# Patient Record
Sex: FEMALE | Race: BLACK OR AFRICAN AMERICAN | NOT HISPANIC OR LATINO | Employment: UNEMPLOYED | URBAN - METROPOLITAN AREA
[De-identification: names, ages, dates, MRNs, and addresses within clinical notes are randomized per-mention and may not be internally consistent; named-entity substitution may affect disease eponyms.]

---

## 2020-02-04 ENCOUNTER — HOSPITAL ENCOUNTER (EMERGENCY)
Facility: HOSPITAL | Age: 35
Discharge: HOME | End: 2020-02-04
Attending: EMERGENCY MEDICINE
Payer: MEDICAID

## 2020-02-04 VITALS
RESPIRATION RATE: 18 BRPM | OXYGEN SATURATION: 96 % | DIASTOLIC BLOOD PRESSURE: 89 MMHG | BODY MASS INDEX: 46.95 KG/M2 | HEIGHT: 64 IN | TEMPERATURE: 98 F | SYSTOLIC BLOOD PRESSURE: 156 MMHG | HEART RATE: 78 BPM | WEIGHT: 275 LBS

## 2020-02-04 DIAGNOSIS — S93.601A SPRAIN OF RIGHT FOOT, INITIAL ENCOUNTER: Primary | ICD-10-CM

## 2020-02-04 DIAGNOSIS — R52 PAIN: ICD-10-CM

## 2020-02-04 LAB
B-HCG UR QL: NEGATIVE
CTP QC/QA: YES

## 2020-02-04 PROCEDURE — 81025 URINE PREGNANCY TEST: CPT | Mod: ER | Performed by: EMERGENCY MEDICINE

## 2020-02-04 PROCEDURE — 25000003 PHARM REV CODE 250: Mod: ER | Performed by: NURSE PRACTITIONER

## 2020-02-04 PROCEDURE — 99284 EMERGENCY DEPT VISIT MOD MDM: CPT | Mod: 25,ER

## 2020-02-04 RX ORDER — IBUPROFEN 800 MG/1
800 TABLET ORAL EVERY 6 HOURS PRN
Qty: 24 TABLET | Refills: 0 | Status: SHIPPED | OUTPATIENT
Start: 2020-02-04 | End: 2020-07-22

## 2020-02-04 RX ORDER — KETOROLAC TROMETHAMINE 10 MG/1
10 TABLET, FILM COATED ORAL
Status: COMPLETED | OUTPATIENT
Start: 2020-02-04 | End: 2020-02-04

## 2020-02-04 RX ADMIN — KETOROLAC TROMETHAMINE 10 MG: 10 TABLET, FILM COATED ORAL at 06:02

## 2020-02-04 NOTE — ED PROVIDER NOTES
"Encounter Date: 2020    SCRIBE #1 NOTE: I, Steven Morin, am scribing for, and in the presence of,  RONNIE Reaves. I have scribed the following portions of the note - Other sections scribed: HPI, ROS, PE.       History     Chief Complaint   Patient presents with    Foot Pain     right foot pain x several months. states twisted it and "it's been bothering me"     This is a nontoxic appearing 34 y.o. female with pain to her right foot onset several months. States she may have possibly twisted her foot again recently and has been feeling increased pain since. Denies any numbness or tingling.    The history is provided by the patient. No  was used.   Foot Pain   This is a recurrent problem. The current episode started more than 1 week ago. The problem occurs constantly. The problem has been gradually worsening. Pertinent negatives include no chest pain and no shortness of breath. The symptoms are aggravated by bending, twisting and walking.     Review of patient's allergies indicates:   Allergen Reactions    Sulfa (sulfonamide antibiotics) Swelling     Past Medical History:   Diagnosis Date    Asthma     last inhaler use at beginning of pregnancy    Gestational diabetes      Past Surgical History:   Procedure Laterality Date     SECTION      CHOLECYSTECTOMY      excision bilateral axillary hidradenitis       Family History   Problem Relation Age of Onset    Hypertension Mother     Hypertension Father     Hypertension Brother     Diabetes Brother     Diabetes Paternal Aunt      Social History     Tobacco Use    Smoking status: Never Smoker   Substance Use Topics    Alcohol use: Yes     Comment: pre pregnancy    Drug use: No     Review of Systems   Constitutional: Negative.    HENT: Negative.    Eyes: Negative.    Respiratory: Negative.  Negative for shortness of breath.    Cardiovascular: Negative.  Negative for chest pain.   Gastrointestinal: Negative.    Endocrine: " Negative.    Genitourinary: Negative.    Musculoskeletal: Positive for arthralgias.        Right foot pain    Skin: Negative.    Allergic/Immunologic: Negative.    Neurological: Negative.  Negative for weakness and numbness.   Hematological: Negative.    Psychiatric/Behavioral: Negative.    All other systems reviewed and are negative.      Physical Exam     Initial Vitals [02/04/20 1651]   BP Pulse Resp Temp SpO2   (!) 161/99 81 17 98.2 °F (36.8 °C) 97 %      MAP       --         Physical Exam    Nursing note and vitals reviewed.  Constitutional: She appears well-developed.   HENT:   Right Ear: External ear normal.   Left Ear: External ear normal.   Nose: Nose normal.   Mouth/Throat: Oropharynx is clear and moist.   Eyes: Conjunctivae are normal.   Neck: Normal range of motion. Neck supple.   Cardiovascular: Normal rate, regular rhythm, S1 normal, S2 normal, normal heart sounds and intact distal pulses.   Pulses:       Dorsalis pedis pulses are 2+ on the right side, and 2+ on the left side.   Pulmonary/Chest: Effort normal and breath sounds normal. She has no rhonchi. She has no rales.   Abdominal: Soft. She exhibits no distension.   Musculoskeletal: Normal range of motion. She exhibits no edema.        Right foot: There is tenderness. There is normal range of motion, no swelling and no deformity.        Feet:    Neurological: She is alert and oriented to person, place, and time.   Skin: Skin is warm and dry. Capillary refill takes less than 2 seconds. No rash noted.   Psychiatric: She has a normal mood and affect. Her behavior is normal.         ED Course   Procedures  Labs Reviewed   POCT URINE PREGNANCY          Imaging Results    None       Imaging Results          X-Ray Foot Complete Right (Final result)  Result time 02/04/20 17:34:28    Final result by Rowdy Camacho MD (02/04/20 17:34:28)                 Impression:      No evidence for acute fracture or dislocation.  Generalized soft tissue  swelling.      Electronically signed by: Rowdy Camacho MD  Date:    02/04/2020  Time:    17:34             Narrative:    EXAMINATION:  XR FOOT COMPLETE 3 VIEW RIGHT    CLINICAL HISTORY:  . Pain, unspecified    TECHNIQUE:  AP, lateral, and oblique views of the right foot were performed.    COMPARISON:  None    FINDINGS:  No evidence for acute fracture, dislocation or destructive process.  Lisfranc joint is congruent.  Joint spaces are maintained.  There is marked soft tissue swelling throughout the entire foot.                                  Medical Decision Making:   History:   Old Medical Records: I decided to obtain old medical records.  Initial Assessment:   This is a nontoxic appearing 34 y.o. female with pain to her right foot onset several months. Denies any numbness or tingling.   Differential Diagnosis:   Sprain vs. Fracture.  Independently Interpreted Test(s):   I have ordered and independently interpreted X-rays - see prior notes.  Clinical Tests:   Lab Tests: Ordered and Reviewed  The following lab test(s) were unremarkable: UPT  Radiological Study: Ordered and Reviewed  ED Management:  Medicated with Toradol 10 mg orally.  Post-op shoe applied to right foot.  Discharged with Motrin 800 mg as needed.  Follow-up with PCP in 2 days.             Scribe Attestation:   Scribe #1: I performed the above scribed service and the documentation accurately describes the services I performed. I attest to the accuracy of the note.    This document was produced by a scribe under my direction and in my presence. I agree with the content of the note and have made any necessary edits.     RONNIE Reaves    02/05/2020 7:45 AM                      Clinical Impression:     1. Sprain of right foot, initial encounter    2. Pain                                RONNIE Jeronimo  02/05/20 [id number]

## 2021-12-22 ENCOUNTER — HOSPITAL ENCOUNTER (EMERGENCY)
Facility: HOSPITAL | Age: 36
Discharge: HOME | End: 2021-12-22
Attending: EMERGENCY MEDICINE
Payer: MEDICAID

## 2021-12-22 VITALS
TEMPERATURE: 99 F | SYSTOLIC BLOOD PRESSURE: 161 MMHG | WEIGHT: 200 LBS | BODY MASS INDEX: 34.15 KG/M2 | RESPIRATION RATE: 18 BRPM | OXYGEN SATURATION: 99 % | DIASTOLIC BLOOD PRESSURE: 89 MMHG | HEART RATE: 78 BPM | HEIGHT: 64 IN

## 2021-12-22 DIAGNOSIS — I10 HYPERTENSION, UNSPECIFIED TYPE: ICD-10-CM

## 2021-12-22 DIAGNOSIS — U07.1 COVID-19: Primary | ICD-10-CM

## 2021-12-22 LAB
B-HCG UR QL: NEGATIVE
CTP QC/QA: YES
CTP QC/QA: YES
INFLUENZA A ANTIGEN, POC: NEGATIVE
INFLUENZA B ANTIGEN, POC: NEGATIVE
SARS-COV-2 RDRP RESP QL NAA+PROBE: POSITIVE

## 2021-12-22 PROCEDURE — 87804 INFLUENZA ASSAY W/OPTIC: CPT | Mod: ER

## 2021-12-22 PROCEDURE — 81025 URINE PREGNANCY TEST: CPT | Mod: ER | Performed by: EMERGENCY MEDICINE

## 2021-12-22 PROCEDURE — 99284 EMERGENCY DEPT VISIT MOD MDM: CPT | Mod: 25,ER

## 2021-12-22 PROCEDURE — U0002 COVID-19 LAB TEST NON-CDC: HCPCS | Mod: ER | Performed by: EMERGENCY MEDICINE

## 2021-12-22 RX ORDER — ACETAMINOPHEN 500 MG
500 TABLET ORAL EVERY 6 HOURS PRN
Qty: 20 TABLET | Refills: 0 | Status: SHIPPED | OUTPATIENT
Start: 2021-12-22

## 2021-12-22 RX ORDER — IBUPROFEN 600 MG/1
600 TABLET ORAL EVERY 6 HOURS PRN
Qty: 20 TABLET | Refills: 0 | Status: SHIPPED | OUTPATIENT
Start: 2021-12-22

## 2021-12-22 RX ORDER — BENZONATATE 100 MG/1
100 CAPSULE ORAL 3 TIMES DAILY PRN
Qty: 20 CAPSULE | Refills: 0 | Status: SHIPPED | OUTPATIENT
Start: 2021-12-22 | End: 2022-01-01

## 2021-12-22 RX ORDER — ALBUTEROL SULFATE 90 UG/1
1-2 AEROSOL, METERED RESPIRATORY (INHALATION) EVERY 6 HOURS PRN
Qty: 8 G | Refills: 0 | Status: SHIPPED | OUTPATIENT
Start: 2021-12-22 | End: 2022-12-22

## 2021-12-22 NOTE — DISCHARGE INSTRUCTIONS
You have COVID-19.  Alternate ibuprofen and Tylenol for fever and pain.    Please return to the Emergency Department for any new or worsening symptoms including: chest pain, shortness of breath, loss of consciousness, dizziness, weakness, or any other concerns.     Please follow up with your Primary Care Provider within the week. If you do not have one, you may contact the one listed on your discharge paperwork or you may also call the [facility] Appointment Desk at [phone] to schedule an appointment with one.     Please take all medication as prescribed.

## 2021-12-22 NOTE — ED PROVIDER NOTES
Encounter Date: 2021    SCRIBE #1 NOTE: I, Timothy aMuro, am scribing for, and in the presence of, Keyana Cooper NP.       History     Chief Complaint   Patient presents with    COVID-19 Concerns    Sore Throat     Painful cough x 2 days, chills, scratchy throat, body aches, in contact with covid + pt last week     36 year old female with Asthma and HTN who presents to the ED with complaints of sore throat, cough, and chills since yesterday. Patient endorses a known COVID-19 contact last week. She is not vaccinated against COVID-19. No other complaints at this time.      The history is provided by the patient. No  was used.     Review of patient's allergies indicates:   Allergen Reactions    Sulfa (sulfonamide antibiotics) Swelling     Past Medical History:   Diagnosis Date    Asthma     last inhaler use at beginning of pregnancy    Gestational diabetes      Past Surgical History:   Procedure Laterality Date     SECTION      CHOLECYSTECTOMY      excision bilateral axillary hidradenitis       Family History   Problem Relation Age of Onset    Hypertension Mother     Hypertension Father     Hypertension Brother     Diabetes Brother     Diabetes Paternal Aunt      Social History     Tobacco Use    Smoking status: Never Smoker    Smokeless tobacco: Never Used   Substance Use Topics    Alcohol use: Yes     Comment: ocasionally    Drug use: No     Review of Systems   Constitutional: Positive for chills.   HENT: Positive for sore throat.    Respiratory: Positive for cough.    All other systems reviewed and are negative.      Physical Exam     Initial Vitals [21 1013]   BP Pulse Resp Temp SpO2   (!) 161/89 78 18 98.5 °F (36.9 °C) 99 %      MAP       --         Physical Exam    Constitutional: She appears well-developed and well-nourished. She is not diaphoretic. No distress.   HENT:   Head: Normocephalic and atraumatic.   Neck:   Normal range of  motion.  Pulmonary/Chest: No respiratory distress.   Speaking in full and clear sentences without dyspnea or pause.  No tachypnea.   Musculoskeletal:         General: Normal range of motion.      Cervical back: Normal range of motion.     Neurological: She is alert and oriented to person, place, and time.   Skin: Skin is warm and dry.   Psychiatric: She has a normal mood and affect. Her behavior is normal.         ED Course   Procedures  Labs Reviewed   SARS-COV-2 RDRP GENE - Abnormal; Notable for the following components:       Result Value    POC Rapid COVID Positive (*)     All other components within normal limits    Narrative:     This test utilizes isothermal nucleic acid amplification   technology to detect the SARS-CoV-2 RdRp nucleic acid segment.   The analytical sensitivity (limit of detection) is 125 genome   equivalents/mL.   A POSITIVE result implies infection with the SARS-CoV-2 virus;   the patient is presumed to be contagious.     A NEGATIVE result means that SARS-CoV-2 nucleic acids are not   present above the limit of detection. A NEGATIVE result should be   treated as presumptive. It does not rule out the possibility of   COVID-19 and should not be the sole basis for treatment decisions.   If COVID-19 is strongly suspected based on clinical and exposure   history, re-testing using an alternate molecular assay should be   considered.   This test is only for use under the Food and Drug   Administration s Emergency Use Authorization (EUA).   Commercial kits are provided by [employer].   Performance characteristics of the EUA have been independently   verified by [facility] Department of   Pathology and Laboratory Medicine.   _________________________________________________________________   The authorized Fact Sheet for Healthcare Providers and the authorized Fact   Sheet for Patients of the ID NOW COVID-19 are available on the FDA   website:      https://www.fda.gov/media/700053/download  https://www.fda.gov/media/339589/download       POCT URINE PREGNANCY   POCT INFLUENZA A/B MOLECULAR   POCT RAPID INFLUENZA A/B          Imaging Results    None          Medications - No data to display  Medical Decision Making:   Clinical Tests:   Lab Tests: Ordered and Reviewed  ED Management:  This is an emergent evaluation of a 36-year-old female with hypertension and asthma presenting to the ED for URI symptoms.  COVID is positive.  No SOB, respiratory distress, or hypoxia. Vitals normalized and stable. Remains well appearing while in ED.     Referral placed for outpatient antibody infusion.  Sent home with supportive care. We discuss home quarantine . Advising follow-up with PCP. Strict return precautions discussed with patient who is agreeable to the plan.    I discussed with the patient the diagnosis, treatment plan, indications for return to the emergency department, and for expected follow-up. The patient verbalized an understanding. The patient is asked if there are any questions or concerns. We discuss the case, until all issues are addressed to the patients satisfaction. Patient understands and is agreeable to the plan.           Scribe Attestation:   Scribe #1: I performed the above scribed service and the documentation accurately describes the services I performed. I attest to the accuracy of the note.                 Clinical Impression:   Final diagnoses:  [U07.1] COVID-19 (Primary)  [I10] Hypertension, unspecified type          ED Disposition Condition    Discharge Stable        ED Prescriptions     Medication Sig Dispense Start Date End Date Auth. Provider    ibuprofen (ADVIL,MOTRIN) 600 MG tablet Take 1 tablet (600 mg total) by mouth every 6 (six) hours as needed for Pain or Temperature greater than (100.4). 20 tablet 12/22/2021  Keyana Cooper NP    acetaminophen (TYLENOL) 500 MG tablet Take 1 tablet (500 mg total) by mouth every 6 (six) hours as  needed for Pain or Temperature greater than (100.4). 20 tablet 12/22/2021  Keyana Cooper NP    albuterol (PROVENTIL/VENTOLIN HFA) 90 mcg/actuation inhaler Inhale 1-2 puffs into the lungs every 6 (six) hours as needed for Wheezing or Shortness of Breath. Rescue 8 g 12/22/2021 12/22/2022 Keyana Cooper NP    benzonatate (TESSALON) 100 MG capsule Take 1 capsule (100 mg total) by mouth 3 (three) times daily as needed for Cough. 20 capsule 12/22/2021 1/1/2022 Keyana Cooper NP        Follow-up Information     Follow up With Specialties Details Why Contact Info    [facility] Halima Montero  Schedule an appointment as soon as possible for a visit in 1 day For follow-up if you do not have a primary care doctor [address] [place] [address]  [phone]      [facility] ED Emergency Medicine Go to  If symptoms worsen [address] [facility] [address] [phone]          IINGRID, personally performed the services described in this documentation. All medical record entries made by the scribe were at my direction and in my presence. I have reviewed the chart and agree that the record reflects my personal performance and is accurate and complete.       Keyana Cooper NP  12/22/21 [id number]

## 2022-03-02 ENCOUNTER — HOSPITAL ENCOUNTER (EMERGENCY)
Facility: HOSPITAL | Age: 37
Discharge: HOME | End: 2022-03-02
Attending: EMERGENCY MEDICINE
Payer: MEDICAID

## 2022-03-02 VITALS
TEMPERATURE: 98 F | WEIGHT: 202 LBS | HEIGHT: 64 IN | BODY MASS INDEX: 34.49 KG/M2 | DIASTOLIC BLOOD PRESSURE: 74 MMHG | OXYGEN SATURATION: 99 % | RESPIRATION RATE: 18 BRPM | SYSTOLIC BLOOD PRESSURE: 128 MMHG | HEART RATE: 84 BPM

## 2022-03-02 DIAGNOSIS — K44.9 HIATAL HERNIA: ICD-10-CM

## 2022-03-02 DIAGNOSIS — K42.9 UMBILICAL HERNIA WITHOUT OBSTRUCTION AND WITHOUT GANGRENE: ICD-10-CM

## 2022-03-02 DIAGNOSIS — D64.9 ANEMIA, UNSPECIFIED TYPE: Primary | ICD-10-CM

## 2022-03-02 DIAGNOSIS — R10.9 RIGHT SIDED ABDOMINAL PAIN: ICD-10-CM

## 2022-03-02 PROBLEM — I10 ESSENTIAL HYPERTENSION: Status: ACTIVE | Noted: 2020-11-01

## 2022-03-02 PROBLEM — K21.9 GASTROESOPHAGEAL REFLUX DISEASE: Status: ACTIVE | Noted: 2020-11-01

## 2022-03-02 PROBLEM — K46.9 ABDOMINAL HERNIA: Status: ACTIVE | Noted: 2020-11-01

## 2022-03-02 PROBLEM — E66.01 MORBID OBESITY: Status: ACTIVE | Noted: 2020-11-01

## 2022-03-02 LAB
ALBUMIN SERPL-MCNC: 2.9 G/DL (ref 3.3–5.5)
ALP SERPL-CCNC: 48 U/L (ref 42–141)
B-HCG UR QL: NEGATIVE
BILIRUB SERPL-MCNC: 0.6 MG/DL (ref 0.2–1.6)
BILIRUBIN, POC UA: NEGATIVE
BLOOD, POC UA: ABNORMAL
BUN SERPL-MCNC: 11 MG/DL (ref 7–22)
CALCIUM SERPL-MCNC: 9 MG/DL (ref 8–10.3)
CHLORIDE SERPL-SCNC: 106 MMOL/L (ref 98–108)
CLARITY, POC UA: CLEAR
COLOR, POC UA: YELLOW
CREAT SERPL-MCNC: 0.9 MG/DL (ref 0.6–1.2)
CTP QC/QA: YES
GLUCOSE SERPL-MCNC: 83 MG/DL (ref 73–118)
GLUCOSE, POC UA: NEGATIVE
KETONES, POC UA: NEGATIVE
LEUKOCYTE EST, POC UA: NEGATIVE
NITRITE, POC UA: NEGATIVE
PH UR STRIP: 7 [PH]
POC ALT (SGPT): 13 U/L (ref 10–47)
POC AST (SGOT): 24 U/L (ref 11–38)
POC TCO2: 29 MMOL/L (ref 18–33)
POTASSIUM BLD-SCNC: 3.7 MMOL/L (ref 3.6–5.1)
PROTEIN, POC UA: NEGATIVE
PROTEIN, POC: 7 G/DL (ref 6.4–8.1)
SODIUM BLD-SCNC: 140 MMOL/L (ref 128–145)
SPECIFIC GRAVITY, POC UA: 1.02
UROBILINOGEN, POC UA: 1 E.U./DL

## 2022-03-02 PROCEDURE — 96374 THER/PROPH/DIAG INJ IV PUSH: CPT | Mod: 59,ER

## 2022-03-02 PROCEDURE — 99285 EMERGENCY DEPT VISIT HI MDM: CPT | Mod: 25,ER

## 2022-03-02 PROCEDURE — 63600175 PHARM REV CODE 636 W HCPCS: Mod: ER | Performed by: NURSE PRACTITIONER

## 2022-03-02 PROCEDURE — 25000003 PHARM REV CODE 250: Mod: ER | Performed by: NURSE PRACTITIONER

## 2022-03-02 PROCEDURE — 85025 COMPLETE CBC W/AUTO DIFF WBC: CPT | Mod: ER

## 2022-03-02 PROCEDURE — 81025 URINE PREGNANCY TEST: CPT | Mod: ER | Performed by: EMERGENCY MEDICINE

## 2022-03-02 PROCEDURE — 25500020 PHARM REV CODE 255: Mod: ER | Performed by: EMERGENCY MEDICINE

## 2022-03-02 PROCEDURE — 81003 URINALYSIS AUTO W/O SCOPE: CPT | Mod: ER

## 2022-03-02 PROCEDURE — 96361 HYDRATE IV INFUSION ADD-ON: CPT | Mod: ER

## 2022-03-02 PROCEDURE — 80053 COMPREHEN METABOLIC PANEL: CPT | Mod: ER

## 2022-03-02 RX ORDER — DICYCLOMINE HYDROCHLORIDE 20 MG/1
20 TABLET ORAL 2 TIMES DAILY PRN
Qty: 20 TABLET | Refills: 0 | Status: SHIPPED | OUTPATIENT
Start: 2022-03-02 | End: 2022-04-01

## 2022-03-02 RX ORDER — ONDANSETRON 4 MG/1
4 TABLET, FILM COATED ORAL EVERY 6 HOURS PRN
Qty: 12 TABLET | Refills: 0 | Status: SHIPPED | OUTPATIENT
Start: 2022-03-02

## 2022-03-02 RX ORDER — POLYETHYLENE GLYCOL 3350 17 G/17G
17 POWDER, FOR SOLUTION ORAL DAILY
Qty: 116 G | Refills: 0 | OUTPATIENT
Start: 2022-03-02 | End: 2022-09-25

## 2022-03-02 RX ORDER — KETOROLAC TROMETHAMINE 30 MG/ML
15 INJECTION, SOLUTION INTRAMUSCULAR; INTRAVENOUS
Status: COMPLETED | OUTPATIENT
Start: 2022-03-02 | End: 2022-03-02

## 2022-03-02 RX ADMIN — IOHEXOL 100 ML: 350 INJECTION, SOLUTION INTRAVENOUS at 09:03

## 2022-03-02 RX ADMIN — SODIUM CHLORIDE 1000 ML: 0.9 INJECTION, SOLUTION INTRAVENOUS at 08:03

## 2022-03-02 RX ADMIN — KETOROLAC TROMETHAMINE 15 MG: 30 INJECTION, SOLUTION INTRAMUSCULAR; INTRAVENOUS at 08:03

## 2022-03-03 NOTE — FIRST PROVIDER EVALUATION
Emergency Department TeleTriage Encounter Note      CHIEF COMPLAINT    Chief Complaint   Patient presents with    Abdominal Pain     Reports lower abd pain that radiates to right flank x 3 days. Denies dysuria. Denies hematuria.       VITAL SIGNS   Initial Vitals [03/02/22 1917]   BP Pulse Resp Temp SpO2   132/85 67 18 98 °F (36.7 °C) 99 %      MAP       --            ALLERGIES    Review of patient's allergies indicates:   Allergen Reactions    Sulfa (sulfonamide antibiotics) Swelling       PROVIDER TRIAGE NOTE  This is a teletriage evaluation of a 36 y.o. female presenting to the ED complaining of right sided flank pain for three days.  Blood in urine.  Will obtain labs to check kidney function and defer imaging to onsite provider.     Initial orders will be placed and care will be transferred to an alternate provider when patient is roomed for a full evaluation. Any additional orders and the final disposition will be determined by that provider.           ORDERS  Labs Reviewed   POCT URINALYSIS W/O SCOPE - Abnormal; Notable for the following components:       Result Value    Blood, UA 1+ (*)     All other components within normal limits   POCT URINE PREGNANCY   POCT URINALYSIS W/O SCOPE   POCT CBC   POCT CMP       ED Orders (720h ago, onward)    Start Ordered     Status Ordering Provider    03/02/22 1948 03/02/22 1947  POCT CBC  Once         Ordered OLYA GILLESPIE    03/02/22 1948 03/02/22 1947  POCT CMP  Once         Ordered OLYA GILLESPIE    03/02/22 1941 03/02/22 1941  POCT URINALYSIS W/O SCOPE  Once         Final result EMERGENCY, DEPT PHYSICIAN    03/02/22 1921 03/02/22 1920  POCT urine pregnancy  Once         Final result AYAZ PRUITT    03/02/22 1921 03/02/22 1920  POCT URINALYSIS W/O SCOPE  Once         Ordered AYAZ PRUITT            Virtual Visit Note: The provider triage portion of this emergency department evaluation and documentation was performed via  [employer], a HIPAA-compliant telemedicine application, in concert with a tele-presenter in the room. A face to face patient evaluation with one of my colleagues will occur once the patient is placed in an emergency department room.      DISCLAIMER: This note was prepared with [employer] voice recognition transcription software. Garbled syntax, mangled pronouns, and other bizarre constructions may be attributed to that software system.

## 2022-03-09 NOTE — ADDENDUM NOTE
Encounter addended by: Florinda Arzola on: 3/9/2022 5:48 PM   Actions taken: SmartForm saved, Charge Capture section accepted, Flowsheet accepted

## 2022-04-05 ENCOUNTER — OFFICE VISIT (OUTPATIENT)
Dept: SURGERY | Facility: CLINIC | Age: 37
End: 2022-04-05
Payer: MEDICAID

## 2022-04-05 VITALS
BODY MASS INDEX: 34.48 KG/M2 | HEART RATE: 74 BPM | SYSTOLIC BLOOD PRESSURE: 126 MMHG | HEIGHT: 64 IN | WEIGHT: 201.94 LBS | DIASTOLIC BLOOD PRESSURE: 82 MMHG

## 2022-04-05 DIAGNOSIS — R10.13 ABDOMINAL PAIN, EPIGASTRIC: Primary | ICD-10-CM

## 2022-04-05 PROCEDURE — 1160F PR REVIEW ALL MEDS BY PRESCRIBER/CLIN PHARMACIST DOCUMENTED: ICD-10-PCS | Mod: CPTII,,, | Performed by: SURGERY

## 2022-04-05 PROCEDURE — 1159F PR MEDICATION LIST DOCUMENTED IN MEDICAL RECORD: ICD-10-PCS | Mod: CPTII,,, | Performed by: SURGERY

## 2022-04-05 PROCEDURE — 99203 PR OFFICE/OUTPT VISIT, NEW, LEVL III, 30-44 MIN: ICD-10-PCS | Mod: S$PBB,,, | Performed by: SURGERY

## 2022-04-05 PROCEDURE — 3079F DIAST BP 80-89 MM HG: CPT | Mod: CPTII,,, | Performed by: SURGERY

## 2022-04-05 PROCEDURE — 99213 OFFICE O/P EST LOW 20 MIN: CPT | Mod: PBBFAC | Performed by: SURGERY

## 2022-04-05 PROCEDURE — 3008F BODY MASS INDEX DOCD: CPT | Mod: CPTII,,, | Performed by: SURGERY

## 2022-04-05 PROCEDURE — 99999 PR PBB SHADOW E&M-EST. PATIENT-LVL III: ICD-10-PCS | Mod: PBBFAC,,, | Performed by: SURGERY

## 2022-04-05 PROCEDURE — 3079F PR MOST RECENT DIASTOLIC BLOOD PRESSURE 80-89 MM HG: ICD-10-PCS | Mod: CPTII,,, | Performed by: SURGERY

## 2022-04-05 PROCEDURE — 3008F PR BODY MASS INDEX (BMI) DOCUMENTED: ICD-10-PCS | Mod: CPTII,,, | Performed by: SURGERY

## 2022-04-05 PROCEDURE — 1159F MED LIST DOCD IN RCRD: CPT | Mod: CPTII,,, | Performed by: SURGERY

## 2022-04-05 PROCEDURE — 99203 OFFICE O/P NEW LOW 30 MIN: CPT | Mod: S$PBB,,, | Performed by: SURGERY

## 2022-04-05 PROCEDURE — 3074F PR MOST RECENT SYSTOLIC BLOOD PRESSURE < 130 MM HG: ICD-10-PCS | Mod: CPTII,,, | Performed by: SURGERY

## 2022-04-05 PROCEDURE — 99999 PR PBB SHADOW E&M-EST. PATIENT-LVL III: CPT | Mod: PBBFAC,,, | Performed by: SURGERY

## 2022-04-05 PROCEDURE — 1160F RVW MEDS BY RX/DR IN RCRD: CPT | Mod: CPTII,,, | Performed by: SURGERY

## 2022-04-05 PROCEDURE — 3074F SYST BP LT 130 MM HG: CPT | Mod: CPTII,,, | Performed by: SURGERY

## 2022-04-05 NOTE — H&P
History & Physical    SUBJECTIVE:     History of Present Illness:  Patient is a 36 y.o. female presents with abdominal pain. Was right sided pain for 3 days, came to the ED 1 month ago. No further pain. Denies f/c/n/v/sob/cp. She is constipated sometimes. New finding of acid reflux. Has lost 100 lbs since sleeve gastrectomy last year.    Chief Complaint   Patient presents with    Consult    Hernia       Review of patient's allergies indicates:   Allergen Reactions    Sulfa (sulfonamide antibiotics) Swelling       Current Outpatient Medications   Medication Sig Dispense Refill    acetaminophen (TYLENOL) 500 MG tablet Take 1 tablet (500 mg total) by mouth every 6 (six) hours as needed for Pain or Temperature greater than (100.4). 20 tablet 0    albuterol (PROVENTIL/VENTOLIN HFA) 90 mcg/actuation inhaler Inhale 1-2 puffs into the lungs every 6 (six) hours as needed for Wheezing or Shortness of Breath. Rescue 8 g 0    ibuprofen (ADVIL,MOTRIN) 600 MG tablet Take 1 tablet (600 mg total) by mouth every 6 (six) hours as needed for Pain or Temperature greater than (100.4). 20 tablet 0    ondansetron (ZOFRAN) 4 MG tablet Take 1 tablet (4 mg total) by mouth every 6 (six) hours as needed for Nausea. 12 tablet 0    polyethylene glycol (GLYCOLAX) 17 gram/dose powder Take 17 g by mouth once daily. 116 g 0     No current facility-administered medications for this visit.       Past Medical History:   Diagnosis Date    Asthma     last inhaler use at beginning of pregnancy    Gestational diabetes     Hypertension      Past Surgical History:   Procedure Laterality Date     SECTION      CHOLECYSTECTOMY      excision bilateral axillary hidradenitis      gastric sleeve  2021     Family History   Problem Relation Age of Onset    Hypertension Mother     Hypertension Father     Hypertension Brother     Diabetes Brother     Diabetes Paternal Aunt      Social History     Tobacco Use    Smoking status: Never  "Smoker    Smokeless tobacco: Never Used   Substance Use Topics    Alcohol use: Yes     Comment: ocasionally    Drug use: No        Review of Systems:  Review of Systems   Constitutional: Negative for chills and fever.   HENT: Negative.    Eyes: Negative.    Respiratory: Negative for cough, chest tightness and shortness of breath.    Cardiovascular: Negative.    Gastrointestinal: Positive for constipation. Negative for abdominal pain, blood in stool, diarrhea, nausea and vomiting.        Acid reflux   Endocrine: Negative for cold intolerance and heat intolerance.   Genitourinary: Negative.    Musculoskeletal: Negative.    Skin: Negative.  Negative for rash.   Neurological: Negative for dizziness, syncope and light-headedness.   Psychiatric/Behavioral: Negative for agitation, confusion and hallucinations.       OBJECTIVE:     Vital Signs (Most Recent)  Pulse: 74 (04/05/22 1522)  BP: 126/82 (04/05/22 1522)  5' 4" (1.626 m)  91.6 kg (201 lb 15.1 oz)     Physical Exam:  Physical Exam  Constitutional:       General: She is not in acute distress.     Appearance: She is well-developed. She is obese. She is not diaphoretic.   HENT:      Head: Normocephalic and atraumatic.   Eyes:      Conjunctiva/sclera: Conjunctivae normal.      Pupils: Pupils are equal, round, and reactive to light.   Cardiovascular:      Rate and Rhythm: Normal rate and regular rhythm.      Heart sounds: Normal heart sounds. No murmur heard.    No friction rub. No gallop.   Pulmonary:      Effort: Pulmonary effort is normal. No respiratory distress.      Breath sounds: Normal breath sounds. No stridor. No wheezing.   Abdominal:      General: Bowel sounds are normal. There is no distension.      Palpations: Abdomen is soft.      Tenderness: There is no abdominal tenderness.   Musculoskeletal:         General: Normal range of motion.      Cervical back: Normal range of motion and neck supple.   Skin:     General: Skin is warm and dry.      Findings: No " rash.   Neurological:      Mental Status: She is alert and oriented to person, place, and time.      Cranial Nerves: No cranial nerve deficit.   Psychiatric:         Behavior: Behavior normal.         Laboratory  CBC: Reviewed  BMP: Reviewed  wnl    Diagnostic Results:  Official report:  Impression:     No acute abdominal or pelvic pathology CT of the abdomen and pelvis with contrast.     Gastric sleeve.  Small hiatal hernia.    CT: Reviewed  constipation. tiny umbilical hernia. small hiatal hernia.    ASSESSMENT/PLAN:     36 yr old obese black female w hiatal hernia (small), umbilical hernia, a recent hx of short lived right sided abdominal pain with constipation    PLAN:Plan     Metamucil for constipation.  Umbilical hernia discussed and evaluated, risks discussed, opts for no surgery, I do not think this contributed to pain  Hiatal hernia can contribute to GERD. Recommend metamucil. If symptoms persist of worsen she can follow up with Dr Diop who performed sleeve gastrectomy and fixes hiatal hernias  rtc prn.

## 2022-07-06 NOTE — ED PROVIDER NOTES
CONCERNS: not saying any words.   Child accompanied by mother  Mother's work status: part time  DIET:      Milk - nursing still, no cows milk yet      Frequency - 4 feedings / day      Appetite - good  STOOLS: 2 / day  SLEEP:      Naps - 2 hr / day      Night - 11 hr  IMMUNIZATION REACTIONS: NO  VARICELLA STATUS: confirmed by vaccine administration  GROWTH & DEVELOPMENT      Imitates housework - YES      Philo of 2 cubes - YES      3 words - NO      Walks well - YES      Patient would like communication of their results via:        Cell Phone:   Telephone Information:   Mobile [phone]     Okay to leave a message containing results? Yes    Health Maintenance Due   Topic Date Due   • Well Child Exam 18 Months  07/06/2022       Patient is due for the topics as listed above and wishes to proceed with them.  Appt scheduled to perform Immunization(s) Hep A and Well Child Exam. Orders placed for Immunization(s) Hep A and Well Child Exam.    Vaccine Information Statement(s) or the Emergency Use Authorization was given today. This has been reviewed, questions answered, and verbal consent given by Parent for injection(s) and administration of Hepatitis A.    [facility] dispensed vaccine administered.    Patient tolerated without incident. See immunization grid for documentation.     Encounter Date: 3/2/2022       History     Chief Complaint   Patient presents with    Abdominal Pain     Reports lower abd pain that radiates to right flank x 3 days. Denies dysuria. Denies hematuria.     CC: Abdominal pain    HPI:  This is a 36-year-old female presenting to the ED with 3 day history of right-sided abdominal pain that radiates to the right flank.  Pain is worse with movement.  She reports diarrhea 3 days ago which has resolved.  Denies any fever, chills, chest pain, shortness of breath, nausea, vomiting, urinary complaints.    The history is provided by the patient. No  was used.     Review of patient's allergies indicates:   Allergen Reactions    Sulfa (sulfonamide antibiotics) Swelling     Past Medical History:   Diagnosis Date    Asthma     last inhaler use at beginning of pregnancy    Gestational diabetes     Hypertension      Past Surgical History:   Procedure Laterality Date     SECTION      CHOLECYSTECTOMY      excision bilateral axillary hidradenitis      gastric sleeve  2021     Family History   Problem Relation Age of Onset    Hypertension Mother     Hypertension Father     Hypertension Brother     Diabetes Brother     Diabetes Paternal Aunt      Social History     Tobacco Use    Smoking status: Never Smoker    Smokeless tobacco: Never Used   Substance Use Topics    Alcohol use: Yes     Comment: ocasionally    Drug use: No     Review of Systems   Constitutional: Negative for chills and fever.   HENT: Negative for sore throat.    Respiratory: Negative for shortness of breath.    Cardiovascular: Negative for chest pain.   Gastrointestinal: Positive for abdominal pain. Negative for nausea and vomiting.   Genitourinary: Positive for flank pain. Negative for dysuria.   Musculoskeletal: Negative for back pain.   Skin: Negative for rash.   Neurological: Negative for weakness.   Hematological: Does not bruise/bleed easily.       Physical Exam      Initial Vitals [03/02/22 1917]   BP Pulse Resp Temp SpO2   132/85 67 18 98 °F (36.7 °C) 99 %      MAP       --         Physical Exam    Constitutional: She appears well-developed and well-nourished. She is not diaphoretic. No distress.   HENT:   Head: Normocephalic and atraumatic.   Neck:   Normal range of motion.  Cardiovascular: Normal rate, regular rhythm, normal heart sounds and intact distal pulses.   Pulmonary/Chest: Breath sounds normal. No respiratory distress.   Abdominal: Abdomen is soft. Bowel sounds are normal. There is abdominal tenderness in the right lower quadrant.   Musculoskeletal:         General: Normal range of motion.      Cervical back: Normal range of motion.     Neurological: She is alert and oriented to person, place, and time.   Skin: Skin is warm and dry.   Psychiatric: She has a normal mood and affect. Her behavior is normal.         ED Course   Procedures  Labs Reviewed   POCT URINALYSIS W/O SCOPE - Abnormal; Notable for the following components:       Result Value    Blood, UA 1+ (*)     All other components within normal limits   POCT URINE PREGNANCY   POCT URINALYSIS W/O SCOPE   POCT CBC   POCT CMP   POCT LIVER PANEL   POCT CMP          Imaging Results          CT Abdomen Pelvis With Contrast (Final result)  Result time 03/02/22 21:11:50    Final result by Zulma Tovar MD (03/02/22 21:11:50)                 Impression:      No acute abdominal or pelvic pathology CT of the abdomen and pelvis with contrast.    Gastric sleeve.  Small hiatal hernia.      Electronically signed by: Zulma Tovar  Date:    03/02/2022  Time:    21:11             Narrative:    EXAMINATION:  CT OF ABDOMEN PELVIS WITH    CLINICAL HISTORY:  RLQ abdominal pain (Age >= 14y);    TECHNIQUE:  5 mm enhanced axial images were obtained from the lung bases through the greater trochanters.  One hundred mL of Omnipaque 350 was injected.    COMPARISON:  10/04/2016    FINDINGS:  The liver, spleen, pancreas,  kidneys, and adrenal glands are unremarkable. The gallbladder is not visualized.    Postsurgical changes of a gastric sleeve are seen.    There is no definite evidence for abdominal adenopathy or ascites.  There is a tiny fat containing umbilical hernia.    Moderate fecal material is present.  There are no pelvic masses or adenopathy.  The appendix is not inflamed.    There is no free fluid in the pelvis.    At the lung bases, there is a small hiatal hernia.                                 Medications   sodium chloride 0.9% bolus 1,000 mL (0 mLs Intravenous Stopped 3/2/22 2140)   ketorolac injection 15 mg (15 mg Intravenous Given 3/2/22 2032)   iohexoL (OMNIPAQUE 350) injection 100 mL (100 mLs Intravenous Given 3/2/22 2100)     Medical Decision Making:   ED Management:  36-year-old female presenting to the ED with right-sided abdominal pain that radiates to the right flank x3 days.  No fever chills.  She is well-appearing.  Vital signs are stable reassuring.  She is not tachycardic.  She is afebrile.  There is tenderness of the right mid and right lower abdominal quadrants.  No guarding or rigidity.  No CVA tenderness.  Urinalysis without infection.  CBC with anemia.  No leukocytosis concerning for systemic infection.  CMP and liver panel reassuring.  CT scan without any acute abnormalities such as appendicitis.  There is a gastric sleeve.  Small hiatal hernia.  Tiny umbilical hernia.  She had some improvement after Toradol and fluids.  I will discharge her home with symptomatic relief.  Outpatient follow-up with PCP for further evaluation and treatment of anemia, General surgery for treatment of hernias.    Based on my clinical evaluation, I do not appreciate any immediate, emergent, or life threatening condition or etiology that warrants additional workup today.  I feel the patient can be discharged with close follow-up care.                      Clinical Impression:   Final diagnoses:  [D64.9] Anemia, unspecified  type (Primary)  [K42.9] Umbilical hernia without obstruction and without gangrene  [K44.9] Hiatal hernia  [R10.9] Right sided abdominal pain          ED Disposition Condition    Discharge Stable        ED Prescriptions     Medication Sig Dispense Start Date End Date Auth. Provider    dicyclomine (BENTYL) 20 mg tablet Take 1 tablet (20 mg total) by mouth 2 (two) times daily as needed (Stomach cramping). 20 tablet 3/2/2022 4/1/2022 Keyana Cooper NP    polyethylene glycol (GLYCOLAX) 17 gram/dose powder Take 17 g by mouth once daily. 116 g 3/2/2022  Keyana Cooper NP    ondansetron (ZOFRAN) 4 MG tablet Take 1 tablet (4 mg total) by mouth every 6 (six) hours as needed for Nausea. 12 tablet 3/2/2022  Keyana Cooper NP        Follow-up Information     Follow up With Specialties Details Why Contact Info    Jamie Rodriguez MD Obstetrics Schedule an appointment as soon as possible for a visit  For follow-up----anemia, abdominal pain [address]  [address]  [place] [facility] [address] [phone]      Zhou Weems MD General Surgery, Oncology Schedule an appointment as soon as possible for a visit  For follow-up [address] [place] [address]  [id number]  [facility] [address] [phone]      [facility] Emergency Medicine Go to  If symptoms worsen [address] [facility] [address] [phone]           Keyana Cooper NP  03/02/22 [id number]

## 2022-09-25 ENCOUNTER — HOSPITAL ENCOUNTER (EMERGENCY)
Facility: HOSPITAL | Age: 37
Discharge: HOME | End: 2022-09-25
Attending: EMERGENCY MEDICINE
Payer: MEDICAID

## 2022-09-25 VITALS
RESPIRATION RATE: 21 BRPM | HEIGHT: 64 IN | OXYGEN SATURATION: 100 % | BODY MASS INDEX: 36.6 KG/M2 | HEART RATE: 63 BPM | DIASTOLIC BLOOD PRESSURE: 81 MMHG | TEMPERATURE: 98 F | WEIGHT: 214.38 LBS | SYSTOLIC BLOOD PRESSURE: 128 MMHG

## 2022-09-25 DIAGNOSIS — N83.202 OVARIAN CYST, LEFT: ICD-10-CM

## 2022-09-25 DIAGNOSIS — K59.00 CONSTIPATION, UNSPECIFIED CONSTIPATION TYPE: Primary | ICD-10-CM

## 2022-09-25 DIAGNOSIS — N10 ACUTE PYELONEPHRITIS: ICD-10-CM

## 2022-09-25 LAB
B-HCG UR QL: NEGATIVE
BILIRUBIN, POC UA: NEGATIVE
BLOOD, POC UA: ABNORMAL
CLARITY, POC UA: CLEAR
COLOR, POC UA: YELLOW
CTP QC/QA: YES
GLUCOSE, POC UA: NEGATIVE
KETONES, POC UA: ABNORMAL
LEUKOCYTE EST, POC UA: ABNORMAL
NITRITE, POC UA: NEGATIVE
PH UR STRIP: 6 [PH]
PROTEIN, POC UA: ABNORMAL
SPECIFIC GRAVITY, POC UA: >=1.03
UROBILINOGEN, POC UA: 2 E.U./DL

## 2022-09-25 PROCEDURE — 87077 CULTURE AEROBIC IDENTIFY: CPT | Performed by: EMERGENCY MEDICINE

## 2022-09-25 PROCEDURE — 81025 URINE PREGNANCY TEST: CPT | Mod: ER | Performed by: NURSE PRACTITIONER

## 2022-09-25 PROCEDURE — 81003 URINALYSIS AUTO W/O SCOPE: CPT | Mod: ER

## 2022-09-25 PROCEDURE — 87086 URINE CULTURE/COLONY COUNT: CPT | Performed by: EMERGENCY MEDICINE

## 2022-09-25 PROCEDURE — 99284 EMERGENCY DEPT VISIT MOD MDM: CPT | Mod: 25,ER

## 2022-09-25 PROCEDURE — 87088 URINE BACTERIA CULTURE: CPT | Performed by: EMERGENCY MEDICINE

## 2022-09-25 PROCEDURE — 87186 SC STD MICRODIL/AGAR DIL: CPT | Performed by: EMERGENCY MEDICINE

## 2022-09-25 RX ORDER — KETOROLAC TROMETHAMINE 10 MG/1
10 TABLET, FILM COATED ORAL EVERY 6 HOURS PRN
Qty: 12 TABLET | Refills: 0 | Status: SHIPPED | OUTPATIENT
Start: 2022-09-25 | End: 2022-09-28

## 2022-09-25 RX ORDER — POLYETHYLENE GLYCOL 3350 17 G/17G
17 POWDER, FOR SOLUTION ORAL DAILY
Qty: 119 G | Refills: 0 | Status: SHIPPED | OUTPATIENT
Start: 2022-09-25

## 2022-09-25 RX ORDER — CIPROFLOXACIN 500 MG/1
500 TABLET ORAL 2 TIMES DAILY
Qty: 20 TABLET | Refills: 0 | Status: SHIPPED | OUTPATIENT
Start: 2022-09-25 | End: 2022-10-05

## 2022-09-25 RX ORDER — LACTULOSE 10 G/15ML
10 SOLUTION ORAL EVERY 6 HOURS PRN
Qty: 500 ML | Refills: 0 | Status: SHIPPED | OUTPATIENT
Start: 2022-09-25 | End: 2022-09-28

## 2022-09-25 NOTE — Clinical Note
"Jose Luis"Mike Villalpando was seen and treated in our emergency department on 9/25/2022.  She may return to work on 09/27/2022.       If you have any questions or concerns, please don't hesitate to call.      Anuja Bush MD    "

## 2022-09-25 NOTE — Clinical Note
"Jose Luis Herrerapatti Villalpando was seen and treated in our emergency department on 9/25/2022.  She may return to work on 09/27/2022.       If you have any questions or concerns, please don't hesitate to call.      Anuja Bush RN    "

## 2022-09-26 NOTE — ED NOTES
PT INQUIRING AS TO POC UPDATE. INFORMED THAT UA WAS POSITIVE FOR SOME ELEMENTS AND ED PHYSICIAN HAS ORDERED A CT SCAN OF THE ABD AND PELVIS. INFORMED THAT THIS WILL ASSIST WITH DETERMINING POSSIBLE LOCATION OR REASONS FOR INFECTION. GIVEN GOWN AS ED PHYSICIAN ENTERED TO BEDSIDE.

## 2022-09-26 NOTE — ED PROVIDER NOTES
Encounter Date: 2022    SCRIBE #1 NOTE: I, Clari Power, am scribing for, and in the presence of,  Valeria Sharma MD. I have scribed the following portions of the note - Other sections scribed: HPI, ROS, PE.     History     Chief Complaint   Patient presents with    Back Pain    Abdominal Pain    Urinary Frequency     PT C/O LBP RADIATING TO ABD WITH FREQUENCY OFF AND ON SINCE Thursday NIGHT     37 year old female with multiple medical problems including HTN and Asthma presents to the ED with complaints of acute intermittent bilateral lower back pain beginning four days ago. Pt describes the pain as 'aching' and reports associated symptoms of discomfort when urinating, frequency, decreased urine output when urinating, constipation and bilateral lower abdominal pain. Pt states the abdominal pain is not currently present. Denies any recent injury, urinary incontinence, bowel incontinence, hematuria, fever, N/V/D, blood in the stool, black stool and vaginal discharge. Pt states the back pain is slightly alleviated when bending over. Pt attests the she has had her gallbladder removed and has a hernia near the belly button. Denies surgery for the hernia or any recent back problems. Pt states her last menstrual period was 9/15/22. Allergic to Sulfa.     The history is provided by the patient. No  was used.   Review of patient's allergies indicates:   Allergen Reactions    Sulfa (sulfonamide antibiotics) Swelling     Past Medical History:   Diagnosis Date    Asthma     last inhaler use at beginning of pregnancy    Gestational diabetes     Hypertension      Past Surgical History:   Procedure Laterality Date     SECTION      CHOLECYSTECTOMY      excision bilateral axillary hidradenitis      gastric sleeve  2021    TUBAL LIGATION       Family History   Problem Relation Age of Onset    Hypertension Mother     Hypertension Father     Hypertension Brother     Diabetes Brother      Diabetes Paternal Aunt      Social History     Tobacco Use    Smoking status: Never    Smokeless tobacco: Never   Substance Use Topics    Alcohol use: Yes     Comment: ocasionally    Drug use: No     Review of Systems   Constitutional:  Negative for fever.   Gastrointestinal:  Positive for abdominal pain and constipation. Negative for diarrhea, nausea and vomiting.   Genitourinary:  Positive for decreased urine volume and frequency. Negative for hematuria and vaginal discharge.   Musculoskeletal:  Positive for back pain.   All other systems reviewed and are negative.    Physical Exam     Initial Vitals [09/25/22 1938]   BP Pulse Resp Temp SpO2   (!) 136/92 85 (!) 21 98.2 °F (36.8 °C) 99 %      MAP       --         Physical Exam    Nursing note and vitals reviewed.  Constitutional: She appears well-developed and well-nourished. She is not diaphoretic. No distress.   HENT:   Head: Normocephalic and atraumatic.   Mouth/Throat: Oropharynx is clear and moist. No oropharyngeal exudate.   Eyes: EOM are normal. Pupils are equal, round, and reactive to light.   Neck: Neck supple.   Normal range of motion.  Cardiovascular:  Normal rate, regular rhythm and intact distal pulses.           No murmur heard.  Pulmonary/Chest: Breath sounds normal. No stridor. No respiratory distress.   Abdominal: Abdomen is soft. Bowel sounds are normal. There is abdominal tenderness in the left lower quadrant.   No right CVA tenderness.  No left CVA tenderness. There is no rebound and no guarding.   Musculoskeletal:         General: No tenderness or edema. Normal range of motion.      Cervical back: Normal range of motion and neck supple.     Neurological: She is alert and oriented to person, place, and time. She has normal strength. No cranial nerve deficit.   Skin: Skin is warm and dry. No erythema. No pallor.   Psychiatric: She has a normal mood and affect.       ED Course   Procedures  Labs Reviewed   POCT URINALYSIS W/O SCOPE - Abnormal;  Notable for the following components:       Result Value    Ketones, UA Trace (*)     Spec Grav UA >=1.030 (*)     Blood, UA 2+ (*)     Protein, UA 1+ (*)     Urobilinogen, UA 2.0 (*)     Leukocytes, UA 1+ (*)     All other components within normal limits   CULTURE, URINE   POCT URINALYSIS W/O SCOPE   POCT URINE PREGNANCY    Narrative:     This test utilizes isothermal nucleic acid amplification   technology to detect the SARS-CoV-2 RdRp nucleic acid segment.   The analytical sensitivity (limit of detection) is 125 genome   equivalents/mL.   A POSITIVE result implies infection with the SARS-CoV-2 virus;   the patient is presumed to be contagious.     A NEGATIVE result means that SARS-CoV-2 nucleic acids are not   present above the limit of detection. A NEGATIVE result should be   treated as presumptive. It does not rule out the possibility of   COVID-19 and should not be the sole basis for treatment decisions.   If COVID-19 is strongly suspected based on clinical and exposure   history, re-testing using an alternate molecular assay should be   considered.   This test is only for use under the Food and Drug   Administration s Emergency Use Authorization (EUA).   Commercial kits are provided by [employer].   Performance characteristics of the EUA have been independently   verified by [facility] Department of   Pathology and Laboratory Medicine.   _________________________________________________________________   The authorized Fact Sheet for Healthcare Providers and the authorized Fact   Sheet for Patients of the ID NOW COVID-19 are available on the FDA   website:     https://www.fda.gov/media/570204/download  https://www.fda.gov/media/660010/download                 Imaging Results               CT Renal Stone Study ABD Pelvis WO (Final result)  Result time 09/25/22 22:42:06      Final result by Ke Joyce MD (09/25/22 22:42:06)                   Impression:      There is no evidence for  ureteral calculus or obstructive uropathy bilaterally.    Suspected tiny nonobstructing calculus at the lower pole of the left kidney.    Prominent appearance of the region of the lower uterine segment/cervix for which clinical and historical correlation and evaluation is recommended.    Suspected 2.8 cm left adnexal cyst.    Nonspecific mild prominence of the colon with stool without inflammatory or obstructive pattern.    Additional findings as above.    This report was flagged in Epic as abnormal.      Electronically signed by: Ke Joyce  Date:    09/25/2022  Time:    22:42               Narrative:    EXAMINATION:  CT RENAL STONE STUDY ABD PELVIS WO    CLINICAL HISTORY:  Flank pain, kidney stone suspected;LLQ abdominal pain;    TECHNIQUE:  Low dose axial images, sagittal and coronal reformations were obtained from the lung bases to the pubic symphysis.  Contrast was not administered.    COMPARISON:  CT examination of the abdomen and pelvis March 2, 2022    FINDINGS:  There is no evidence for hydronephrosis or perinephric inflammatory change bilaterally.  There is a tiny density at the lower pole of the left kidney this may relate to a tiny nonobstructing calculus.  The ureters appear normal in caliber along their visualized course to the urinary bladder, there is no evidence for hydroureter, ureteral calculus or obstructive uropathy bilaterally.  The urinary bladder is incompletely distended, appears unremarkable for lack of distention.    The visualized lung bases appear clear.  There is a small to moderate hiatal hernia, postoperative change of the stomach noted.  The gallbladder is not identified, correlation for prior cholecystectomy is needed.  When accounting for limitations of the examination, there is no evidence for acute process of the liver, pancreas, spleen or adrenal glands.  The abdominal aorta appears normal in caliber otherwise not optimally evaluated on this noncontrast examination.    There  is appearance thought to represent a 2.8 cm cyst at the left adnexa measuring approximately 16 Hounsfield units.  There is prominence of the region of the lower uterine segment/cervix for which clinical and historical correlation and evaluation is recommended.    There is a tiny fat density umbilical/periumbilical hernia, without bowel involvement.  There is no evidence for small bowel obstructive process.  The appendix is identified, it does not appear inflamed.  There is mild prominence of the colon with air and stool without inflammatory or obstructive pattern.  There is no evidence for free intraperitoneal air.  The visualized osseous structures appear intact.  There are findings at the sacroiliac joints that may relate to sacroiliitis.                                       Medications - No data to display  Medical Decision Making:   Clinical Tests:   Lab Tests: Ordered and Reviewed        Scribe Attestation:   Scribe #1: I performed the above scribed service and the documentation accurately describes the services I performed. I attest to the accuracy of the note.                 Labs Reviewed        Admission on 09/25/2022, Discharged on 09/25/2022   Component Date Value Ref Range Status    POC Preg Test, Ur 09/25/2022 Negative  Negative Final     Acceptable 09/25/2022 Yes   Final    Glucose, UA 09/25/2022 Negative   Final    Bilirubin, UA 09/25/2022 Negative   Final    Ketones, UA 09/25/2022 Trace (A)   Final    Spec Grav UA 09/25/2022 >=1.030 (>)   Final    Blood, UA 09/25/2022 2+ (A)   Final    PH, UA 09/25/2022 6.0   Final    Protein, UA 09/25/2022 1+ (A)   Final    Urobilinogen, UA 09/25/2022 2.0 (A)  E.U./dL Final    Nitrite, UA 09/25/2022 Negative   Final    Leukocytes, UA 09/25/2022 1+ (A)   Final    Color, UA 09/25/2022 Yellow   Final    Clarity, UA 09/25/2022 Clear   Final        Imaging Reviewed    Imaging Results               CT Renal Stone Study ABD Pelvis WO (Final result)  Result  time 09/25/22 22:42:06      Final result by Ke Joyce MD (09/25/22 22:42:06)                   Impression:      There is no evidence for ureteral calculus or obstructive uropathy bilaterally.    Suspected tiny nonobstructing calculus at the lower pole of the left kidney.    Prominent appearance of the region of the lower uterine segment/cervix for which clinical and historical correlation and evaluation is recommended.    Suspected 2.8 cm left adnexal cyst.    Nonspecific mild prominence of the colon with stool without inflammatory or obstructive pattern.    Additional findings as above.    This report was flagged in Epic as abnormal.      Electronically signed by: Ke Joyce  Date:    09/25/2022  Time:    22:42               Narrative:    EXAMINATION:  CT RENAL STONE STUDY ABD PELVIS WO    CLINICAL HISTORY:  Flank pain, kidney stone suspected;LLQ abdominal pain;    TECHNIQUE:  Low dose axial images, sagittal and coronal reformations were obtained from the lung bases to the pubic symphysis.  Contrast was not administered.    COMPARISON:  CT examination of the abdomen and pelvis March 2, 2022    FINDINGS:  There is no evidence for hydronephrosis or perinephric inflammatory change bilaterally.  There is a tiny density at the lower pole of the left kidney this may relate to a tiny nonobstructing calculus.  The ureters appear normal in caliber along their visualized course to the urinary bladder, there is no evidence for hydroureter, ureteral calculus or obstructive uropathy bilaterally.  The urinary bladder is incompletely distended, appears unremarkable for lack of distention.    The visualized lung bases appear clear.  There is a small to moderate hiatal hernia, postoperative change of the stomach noted.  The gallbladder is not identified, correlation for prior cholecystectomy is needed.  When accounting for limitations of the examination, there is no evidence for acute process of the liver, pancreas,  spleen or adrenal glands.  The abdominal aorta appears normal in caliber otherwise not optimally evaluated on this noncontrast examination.    There is appearance thought to represent a 2.8 cm cyst at the left adnexa measuring approximately 16 Hounsfield units.  There is prominence of the region of the lower uterine segment/cervix for which clinical and historical correlation and evaluation is recommended.    There is a tiny fat density umbilical/periumbilical hernia, without bowel involvement.  There is no evidence for small bowel obstructive process.  The appendix is identified, it does not appear inflamed.  There is mild prominence of the colon with air and stool without inflammatory or obstructive pattern.  There is no evidence for free intraperitoneal air.  The visualized osseous structures appear intact.  There are findings at the sacroiliac joints that may relate to sacroiliitis.                                      Medications given in ED    Medications - No data to display      Note was created using voice recognition software. Note may have occasional typographical errors that may not have been identified and edited despite good eddi initial review prior to signing.    I, Sheri Sharma MD, personally performed the services described in this documentation. All medical record entries made by the scribe were at my direction and in my presence.  I have reviewed the chart and agree that the record reflects my personal performance and is accurate and complete.    Clinical Impression:   Final diagnoses:  [K59.00] Constipation, unspecified constipation type (Primary)  [N10] Acute pyelonephritis  [N83.202] Ovarian cyst, left      ED Disposition Condition    Discharge Stable          ED Prescriptions       Medication Sig Dispense Start Date End Date Auth. Provider    ketorolac (TORADOL) 10 mg tablet Take 1 tablet (10 mg total) by mouth every 6 (six) hours as needed for Pain (take with food). 12 tablet 9/25/2022  9/28/2022 Sheri Sharma MD    lactulose (CHRONULAC) 20 gram/30 mL Soln Take 15 mLs (10 g total) by mouth every 6 (six) hours as needed (constipatio n). 500 mL 9/25/2022 9/28/2022 Sheri Sharma MD    ciprofloxacin HCl (CIPRO) 500 MG tablet Take 1 tablet (500 mg total) by mouth 2 (two) times daily. for 10 days 20 tablet 9/25/2022 10/5/2022 Sheri Sharma MD    polyethylene glycol (GLYCOLAX) 17 gram/dose powder Take 17 g by mouth once daily. 119 g 9/25/2022 -- Sheri Sharma MD          Follow-up Information       Follow up With Specialties Details Why Contact Info Additional Information    Jamie Rodriguez MD Obstetrics Call  As needed, for ongoing care [address]  [address]  [facility] [address] [phone]       The nearest emergency department.  Go to  As needed, If symptoms worsen      [facility] Obstetrics and Gynecology Call in 1 day to schedule an appointment, for re-evaluation of today's complaint, and ongoing care [address] [facility] [address] [phone] [address]nd Floor             Sheri Sharma MD  09/26/22 5037

## 2022-09-26 NOTE — ED NOTES
PT ADVISED ON REASON FOR WAIT. STATES SHE NEEDS TO BE TO WORK AT 0500. WILL PRINT NOTE RE DEPT. VISIT.

## 2022-09-26 NOTE — ED NOTES
PRIOR EDUCATION RE ABX AND LAXATIVES REINFORCED. PT VERBALIZES UNDERSTANDING. [facility] [address] [facility]. ALL QUESTIONS ASKED WERE ANSWERED.

## 2022-09-28 LAB — BACTERIA UR CULT: ABNORMAL

## 2024-03-20 ENCOUNTER — HOSPITAL ENCOUNTER (EMERGENCY)
Facility: HOSPITAL | Age: 39
Discharge: HOME | End: 2024-03-20
Attending: EMERGENCY MEDICINE
Payer: MEDICAID

## 2024-03-20 VITALS
SYSTOLIC BLOOD PRESSURE: 133 MMHG | HEART RATE: 76 BPM | OXYGEN SATURATION: 98 % | WEIGHT: 219.81 LBS | TEMPERATURE: 98 F | RESPIRATION RATE: 17 BRPM | DIASTOLIC BLOOD PRESSURE: 87 MMHG | BODY MASS INDEX: 37.52 KG/M2 | HEIGHT: 64 IN

## 2024-03-20 DIAGNOSIS — J45.21 MILD INTERMITTENT ASTHMA WITH EXACERBATION: ICD-10-CM

## 2024-03-20 DIAGNOSIS — J06.9 VIRAL URI WITH COUGH: Primary | ICD-10-CM

## 2024-03-20 LAB
CTP QC/QA: YES
INFLUENZA A ANTIGEN, POC: NEGATIVE
INFLUENZA B ANTIGEN, POC: NEGATIVE
POC RAPID STREP A: NEGATIVE
SARS-COV-2 RDRP RESP QL NAA+PROBE: NEGATIVE

## 2024-03-20 PROCEDURE — 87635 SARS-COV-2 COVID-19 AMP PRB: CPT | Mod: ER

## 2024-03-20 PROCEDURE — 99284 EMERGENCY DEPT VISIT MOD MDM: CPT | Mod: 25,ER

## 2024-03-20 PROCEDURE — 25000242 PHARM REV CODE 250 ALT 637 W/ HCPCS: Mod: ER

## 2024-03-20 PROCEDURE — 87880 STREP A ASSAY W/OPTIC: CPT | Mod: ER

## 2024-03-20 PROCEDURE — 94640 AIRWAY INHALATION TREATMENT: CPT | Mod: ER

## 2024-03-20 PROCEDURE — 87804 INFLUENZA ASSAY W/OPTIC: CPT | Mod: 59,ER

## 2024-03-20 RX ORDER — ALBUTEROL SULFATE 90 UG/1
1-2 AEROSOL, METERED RESPIRATORY (INHALATION) EVERY 6 HOURS PRN
Qty: 8 G | Refills: 0 | Status: SHIPPED | OUTPATIENT
Start: 2024-03-20 | End: 2025-03-20

## 2024-03-20 RX ORDER — METHYLPREDNISOLONE 4 MG/1
TABLET ORAL
Qty: 1 EACH | Refills: 0 | Status: SHIPPED | OUTPATIENT
Start: 2024-03-20 | End: 2024-04-10

## 2024-03-20 RX ORDER — ACETAMINOPHEN 500 MG
500 TABLET ORAL EVERY 6 HOURS PRN
Qty: 20 TABLET | Refills: 0 | Status: SHIPPED | OUTPATIENT
Start: 2024-03-20

## 2024-03-20 RX ORDER — IPRATROPIUM BROMIDE AND ALBUTEROL SULFATE 2.5; .5 MG/3ML; MG/3ML
3 SOLUTION RESPIRATORY (INHALATION)
Status: COMPLETED | OUTPATIENT
Start: 2024-03-20 | End: 2024-03-20

## 2024-03-20 RX ORDER — CETIRIZINE HYDROCHLORIDE 10 MG/1
10 TABLET ORAL DAILY
Qty: 30 TABLET | Refills: 0 | Status: SHIPPED | OUTPATIENT
Start: 2024-03-20 | End: 2024-04-19

## 2024-03-20 RX ORDER — BENZONATATE 100 MG/1
100 CAPSULE ORAL 3 TIMES DAILY PRN
Qty: 20 CAPSULE | Refills: 0 | Status: SHIPPED | OUTPATIENT
Start: 2024-03-20 | End: 2024-03-30

## 2024-03-20 RX ORDER — IBUPROFEN 600 MG/1
600 TABLET ORAL EVERY 6 HOURS PRN
Qty: 20 TABLET | Refills: 0 | Status: SHIPPED | OUTPATIENT
Start: 2024-03-20

## 2024-03-20 RX ORDER — FLUTICASONE PROPIONATE 50 MCG
1 SPRAY, SUSPENSION (ML) NASAL 2 TIMES DAILY PRN
Qty: 15 G | Refills: 0 | Status: SHIPPED | OUTPATIENT
Start: 2024-03-20

## 2024-03-20 RX ORDER — AMOXICILLIN AND CLAVULANATE POTASSIUM 875; 125 MG/1; MG/1
1 TABLET, FILM COATED ORAL 2 TIMES DAILY
Qty: 14 TABLET | Refills: 0 | Status: SHIPPED | OUTPATIENT
Start: 2024-03-20

## 2024-03-20 RX ADMIN — IPRATROPIUM BROMIDE AND ALBUTEROL SULFATE 3 ML: .5; 3 SOLUTION RESPIRATORY (INHALATION) at 04:03

## 2024-03-20 NOTE — Clinical Note
"Jose Luis"Mike Villalpando was seen and treated in our emergency department on 3/20/2024.  She may return to work on 03/25/2024.       If you have any questions or concerns, please don't hesitate to call.      Chad Beard PA-C"

## 2024-03-20 NOTE — ED PROVIDER NOTES
Encounter Date: 3/20/2024       History     Chief Complaint   Patient presents with    URI     Pt states cough congestion fever x2 weeks      Patient is a 38 y.o. female with a past medical history of hypertension, asthma who presents to the Emergency Department for evaluation of URI symptoms x 2 weeks.  Symptoms include headache, fever, body aches, cough, congestion, and rhinorrhea.  She has not taken any medications for the symptoms. She is vaccinated for COVID but not the flu. 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  She denies chest pain, shortness of breath, abdominal pain. Denies sore throat, difficulty swallowing, or otalgia.     The history is provided by the patient.     Review of patient's allergies indicates:   Allergen Reactions    Sulfa (sulfonamide antibiotics) Swelling     Past Medical History:   Diagnosis Date    Asthma     last inhaler use at beginning of pregnancy    Gestational diabetes     Hypertension      Past Surgical History:   Procedure Laterality Date     SECTION      CHOLECYSTECTOMY      excision bilateral axillary hidradenitis      gastric sleeve  2021    TUBAL LIGATION       Family History   Problem Relation Age of Onset    Hypertension Mother     Hypertension Father     Hypertension Brother     Diabetes Brother     Diabetes Paternal Aunt      Social History     Tobacco Use    Smoking status: Never    Smokeless tobacco: Never   Substance Use Topics    Alcohol use: Yes     Comment: ocasionally    Drug use: No     Review of Systems   Constitutional:  Positive for fever. Negative for chills.   HENT:  Positive for congestion and rhinorrhea. Negative for ear pain, sore throat and trouble swallowing.    Respiratory:  Positive for cough. Negative for shortness of breath.    Cardiovascular:  Negative for chest pain.   Gastrointestinal:  Negative for abdominal pain, diarrhea, nausea and vomiting.   Musculoskeletal:  Positive for myalgias. Negative for  neck pain and neck stiffness.   Neurological:  Positive for headaches. Negative for dizziness and light-headedness.       Physical Exam     Initial Vitals [03/20/24 1610]   BP Pulse Resp Temp SpO2   (!) 143/85 84 20 98.2 °F (36.8 °C) 99 %      MAP       --         Physical Exam    Nursing note and vitals reviewed.  Constitutional: She appears well-developed and well-nourished.   HENT:   Head: Normocephalic and atraumatic.   Right Ear: External ear normal.   Left Ear: External ear normal.   There is no posterior oropharyngeal erythema but a postnasal drip is present, no tonsillar swelling, no oropharyngeal exudates, uvula is midline. Normal dentition. No trismus.  No muffled voice. No submandibular swelling. Patient is tolerating secretions without difficulty.  Patient is speaking in full sentences on exam without difficulty.  Bilateral tympanic membranes are pearly gray without erythema, bulging, perforation.  There is no postauricular swelling, or overlying erythema or tenderness to palpation over mastoids bilaterally.    Neck: Carotid bruit is not present.   Normal range of motion.  Cardiovascular:  Normal rate, regular rhythm, normal heart sounds and intact distal pulses.     Exam reveals no gallop and no friction rub.       No murmur heard.  Pulmonary/Chest: No respiratory distress. She has wheezes in the right upper field and the left upper field. She has no rhonchi. She has no rales.   Abdominal: Abdomen is soft. Bowel sounds are normal. She exhibits no distension. There is no abdominal tenderness. There is no rebound and no guarding.   Musculoskeletal:         General: Normal range of motion.      Cervical back: Normal range of motion.     Neurological: She is alert and oriented to person, place, and time. GCS score is 15. GCS eye subscore is 4. GCS verbal subscore is 5. GCS motor subscore is 6.   Psychiatric: She has a normal mood and affect.         ED Course   Procedures  Labs Reviewed   SARS-COV-2 RDRP  GENE    Narrative:     This test utilizes isothermal nucleic acid amplification technology to detect the SARS-CoV-2 RdRp nucleic acid segment. The analytical sensitivity (limit of detection) is 500 copies/swab.     A POSITIVE result is indicative of the presence of SARS-CoV-2 RNA; clinical correlation with patient history and other diagnostic information is necessary to determine patient infection status.    A NEGATIVE result means that SARS-CoV-2 nucleic acids are not present above the limit of detection. A NEGATIVE result should be treated as presumptive. It does not rule out the possibility of COVID-19 and should not be the sole basis for treatment decisions. If COVID-19 is strongly suspected based on clinical and exposure history, re-testing using an alternate molecular assay should be considered.     Commercial kits are provided by [employer].   _________________________________________________________________   The authorized Fact Sheet for Healthcare Providers and the authorized Fact Sheet for Patients of the ID NOW COVID-19 are available on the FDA website:    https://www.fda.gov/media/898618/download      https://www.fda.gov/media/210904/download      POCT STREP A MOLECULAR   POCT INFLUENZA A/B MOLECULAR   POCT STREP A, RAPID   POCT RAPID INFLUENZA A/B          Imaging Results    None          Medications   albuterol-ipratropium 2.5 mg-0.5 mg/3 mL nebulizer solution 3 mL (3 mLs Nebulization Given 3/20/24 3379)     Medical Decision Making  This is an emergent evaluation of a 38 y.o. female with a past medical history of hypertension, asthma who presents to the Emergency Department for evaluation of URI symptoms x 2 weeks.  Symptoms include headache, fever, body aches, cough, congestion, and rhinorrhea.     On exam, patient looks well. There is no posterior oropharyngeal erythema but a postnasal drip is present, no tonsillar swelling, no oropharyngeal exudates, uvula is midline. Normal dentition. No  trismus.  No muffled voice. No submandibular swelling. Patient is tolerating secretions without difficulty.  Patient is speaking in full sentences on exam without difficulty.  Bilateral tympanic membranes are pearly gray without erythema, bulging, perforation.  There is no postauricular swelling, or overlying erythema or tenderness to palpation over mastoids bilaterally.  There is mild expiratory wheezing heard in the upper lung fields bilaterally. Regular rate rhythm without murmurs.  No carotid bruits appreciated on exam.  Abdomen is soft, nontender, non distended, with normal bowel sounds.     Differential diagnosis includes but is not limited to COVID, flu, strep, mild asthma flare, bronchitis, seasonal allergies, bacterial rhinosinusitis.  Considered pneumonia but doubtful given no rales upon auscultation of the lungs.    Workup initiated with viral swabs.  Ordered DuoNeb.  Viral swabs negative. Wheezing improved. Will discharge home with Tylenol, ibuprofen, Tessalon Perles, Medrol Dosepak, albuterol inhaler, Flonase, Zyrtec.  Although I believe this is viral, since patient has had symptoms for 2 weeks, will go ahead and provide her with a course of Augmentin.  Instructed her to use the other medications as prescribed and if no improvement within about 5 days to start taking the antibiotics.  She verbalizes understanding. Patient is very well appearing, and in no acute distress. Vital signs are reassuring here in the emergency department, patient is afebrile, breathing comfortable, satting 100 % on room air. Patient/Caregiver is stable for discharge at this time. Patient/Caregiver verbalize understanding of care plan. All questions and concerns were addressed. Discussed strict return precautions with the patient/caregiver. Instructed follow up with primary care provider within 1 week.      Chad Beard PA-C    DISCLAIMER: This note was prepared with [employer] voice recognition transcription software. Linda  syntax, mangled pronouns, and other bizarre constructions may be attributed to that software system.     Amount and/or Complexity of Data Reviewed  Labs: ordered.    Risk  Prescription drug management.                                      Clinical Impression:  Final diagnoses:  [J06.9] Viral URI with cough (Primary)  [J45.21] Mild intermittent asthma with exacerbation          ED Disposition Condition    Discharge Stable          ED Prescriptions       Medication Sig Dispense Start Date End Date Auth. Provider    benzonatate (TESSALON) 100 MG capsule Take 1 capsule (100 mg total) by mouth 3 (three) times daily as needed. 20 capsule 3/20/2024 3/30/2024 Chad Beard PA-YARIEL    albuterol (PROVENTIL/VENTOLIN HFA) 90 mcg/actuation inhaler Inhale 1-2 puffs into the lungs every 6 (six) hours as needed. Rescue 8 g 3/20/2024 3/20/2025 Cahd Beard PA-YARIEL    methylPREDNISolone (MEDROL DOSEPACK) 4 mg tablet Follow directions on packaging 1 each 3/20/2024 4/10/2024 Chad Beard PA-YARIEL    cetirizine (ZYRTEC) 10 MG tablet Take 1 tablet (10 mg total) by mouth once daily. 30 tablet 3/20/2024 4/19/2024 Chad Beard PA-YARIEL    fluticasone propionate (FLONASE) 50 mcg/actuation nasal spray 1 spray (50 mcg total) by Each Nostril route 2 (two) times daily as needed for Rhinitis. 15 g 3/20/2024 -- Chad Beard PA-C    amoxicillin-clavulanate 875-125mg (AUGMENTIN) 875-125 mg per tablet Take 1 tablet by mouth 2 (two) times daily. 14 tablet 3/20/2024 -- Chad Beard PA-C    acetaminophen (TYLENOL) 500 MG tablet Take 1 tablet (500 mg total) by mouth every 6 (six) hours as needed. 20 tablet 3/20/2024 -- Chad Beard PA-C    ibuprofen (ADVIL,MOTRIN) 600 MG tablet Take 1 tablet (600 mg total) by mouth every 6 (six) hours as needed for Pain. 20 tablet 3/20/2024 -- Chad Beard PA-C          Follow-up Information       Follow up With Specialties Details Why Contact Info    Jamie Rodriguez MD Obstetrics   [address]  [facility]  [address]  [facility] [address]  [phone]      [facility] Go to  As needed, If symptoms worsen, or new symptoms develop [address] [facility] [address] [phone]             Chad Beard PA-C  03/20/24 8237

## 2024-03-20 NOTE — DISCHARGE INSTRUCTIONS
You were seen in the emergency department today and diagnosed with a viral infection as well as a mild asthma flare.  Please take Medrol Dosepak and other symptomatic medications as prescribed for symptoms.  If you do not feel better within the next few days, you may start taking Augmentin for possible bacterial cause of symptoms.  Make sure you are drinking plenty of fluids and getting plenty of rest.  It was a pleasure taking care of you today.  I hope you feel better.